# Patient Record
Sex: MALE | Race: WHITE | NOT HISPANIC OR LATINO | Employment: FULL TIME | URBAN - METROPOLITAN AREA
[De-identification: names, ages, dates, MRNs, and addresses within clinical notes are randomized per-mention and may not be internally consistent; named-entity substitution may affect disease eponyms.]

---

## 2017-04-19 ENCOUNTER — TELEPHONE (OUTPATIENT)
Dept: DERMATOLOGY | Facility: CLINIC | Age: 27
End: 2017-04-19

## 2017-04-19 NOTE — TELEPHONE ENCOUNTER
Left message for patient regarding upcoming appointment on 4/27/17 at 3:30.  Informed patient to bring an updated list of allergies, medications, pharmacy details and insurance information. Asked patient to bring any dermatology records from outside [place] or the [facility] or have them faxed to [phone].    Patient Reminders Given:  --Plan on being in our facility for approximately one hour, this includes the registration process, office visit, education and check-out process.  If you are having a procedure, more time may be required.     --If you are having a procedure, please, present 15 minutes early.  --We are located on the second floor of the building, check in desk 4.   --If you need to cancel or reschedule, call [phone].  --We look forward to seeing you in Dermatology Clinic.       Anish Martinez Medical Assistant

## 2017-04-27 ENCOUNTER — OFFICE VISIT (OUTPATIENT)
Dept: DERMATOLOGY | Facility: CLINIC | Age: 27
End: 2017-04-27
Payer: COMMERCIAL

## 2017-04-27 DIAGNOSIS — D22.9 MULTIPLE NEVI: Primary | ICD-10-CM

## 2017-04-27 DIAGNOSIS — Z86.018 HISTORY OF DYSPLASTIC NEVUS: ICD-10-CM

## 2017-04-27 PROCEDURE — 99203 OFFICE O/P NEW LOW 30 MIN: CPT | Performed by: DERMATOLOGY

## 2017-04-27 NOTE — NURSING NOTE
"Dermatology Rooming Note    Arash Huston's goals for this visit include:   Chief Complaint   Patient presents with     Skin Check     No areas of concern hx \"pre melanomas\" removed per patient       Is a scribe okay for this visit:YES    Are records needed for this visit(If yes, obtain release of information): No, patient seen over 10 years ago at [facility] in [place]     Vitals: There were no vitals taken for this visit.    Referring Provider:  No referring provider defined for this encounter.    Diamante Silva LPN          "

## 2017-04-27 NOTE — PROGRESS NOTES
"[facility] Dermatology Note      Dermatology Problem List:  1. H/O Dysplastic nevi: per pt. No bx records.   Last TBSE: 4/27/17    Encounter Date: Apr 27, 2017    CC:  Chief Complaint   Patient presents with     Skin Check     No areas of concern hx \"pre melanomas\" removed per patient         History of Present Illness:  Mr. Arash Huston is a 26 year old male who presents for evaluation of skin check. Today, the pt reports he has a hx of \"pre-melanoma\" and would like his skin checked. The pt is otherwise feeling well with no additional skin concerns.      Past Medical History:   There is no problem list on file for this patient.    No past medical history on file.  No past surgical history on file.    Social History:  The patient works as a X-ray Tech. The patient admits to use of tanning beds once in his life. The pt admits to 3-6 alcoholic beverages a week and is a nonsmoker. The pt denies chewing tobacco.     Family History:  There is a family history of melanoma in the patient's cousin/uncle (Had moles removed per patient).    Medications:  Current Outpatient Prescriptions   Medication Sig Dispense Refill     LITHIUM CARBONATE PO        LAMOTRIGINE PO          Not on File    Review of Systems:  -Skin/Heme New Pt: The patient denies frequent sun exposure. The patient denies excessive scarring or problems healing except as per HPI. The patient denies excessive bleeding. The pt admits to having 3 moles removed.   -Constitutional: The patient is otherwise feeling well.     Physical exam:  Vitals: There were no vitals taken for this visit.  GEN: This is a well-nourished, well developed male in no acute distress  NEURO: Alert and oriented  PSYCH: in a pleasant mood, appropriate affect  SKIN: Total skin excluding the undergarment areas was performed. The exam included the head/face, neck, both arms, chest, back, abdomen, both legs, digits and/or nails.   -Multiple regular brown pigmented macules and " papules are identified on the trunk and extremities.   -Sites of prior nevi removal are well healed without pigment recurrence  -There are likely greater than 100 nevi on his skin  -No other lesions of concern on areas examined.       Impression/Plan:  1. History of dysplastic nevus: unknown grading.    Continue routine skin checks given number of nevi.    High numbers of nevi in themselves confer increased risk of melanoma.  2. Multiple Benign nevi    No further intervention required. Patient to report changes.     Follow-up in 1 year, earlier for new or changing lesions.       Staff Involved:  Scribe/Staff      Scribe Disclosure:   I, Christie Parker, am serving as a scribe to document services personally performed by Dr. Jayjay Mullen, based on data collection and the provider's statements to me.     Provider Disclosure:   I have reviewed the documentation recorded by the scribe and have edited it as needed. I have personally performed the services documented here and the documentation accurately represents those services and the decisions made by me.     Jayjay Mullen MD, MS    Department of Dermatology  [facility]: Phone: [phone], Fax:[phone]  [facility]: Phone: [phone], Fax: [phone]

## 2017-04-27 NOTE — MR AVS SNAPSHOT
After Visit Summary   2017    Arash Huston    MRN: [id number]           Patient Information     Date Of Birth          1990        Visit Information        Provider Department      2017 3:30 PM Jayjay Mullen MD [facility]        Today's Diagnoses     Multiple nevi    -  1    History of dysplastic nevus           Follow-ups after your visit        Who to contact     If you have questions or need follow up information about today's clinic visit or your schedule please contact [facility] directly at [phone].  Normal or non-critical lab and imaging results will be communicated to you by BigCalchart, letter or phone within 4 business days after the clinic has received the results. If you do not hear from us within 7 days, please contact the clinic through BigCalchart or phone. If you have a critical or abnormal lab result, we will notify you by phone as soon as possible.  Submit refill requests through Vaavud or call your pharmacy and they will forward the refill request to us. Please allow 3 business days for your refill to be completed.          Additional Information About Your Visit        MyChart Information     Vaavud is an electronic gateway that provides easy, online access to your medical records. With Vaavud, you can request a clinic appointment, read your test results, renew a prescription or communicate with your care team.     To sign up for Vaavud visit the website at [email or web address]   You will be asked to enter the access code listed below, as well as some personal information. Please follow the directions to create your username and password.     Your access code is: [email or web address]  Expires: 2017  3:52 PM     Your access code will  in 90 days. If you need help or a new code, please contact your [facility] or call [phone] for assistance.        Care EveryWhere ID     This is your  Care EveryWhere ID. This could be used by other organizations to access your [facility] medical records  [id number]         Blood Pressure from Last 3 Encounters:   No data found for BP    Weight from Last 3 Encounters:   No data found for Wt              Today, you had the following     No orders found for display       Primary Care Provider    [facility]       No address on file        Thank you!     Thank you for choosing [facility]  for your care. Our goal is always to provide you with excellent care. Hearing back from our patients is one way we can continue to improve our services. Please take a few minutes to complete the written survey that you may receive in the mail after your visit with us. Thank you!             Your Updated Medication List - Protect others around you: Learn how to safely use, store and throw away your medicines at www.disposemymeds.org.          This list is accurate as of: 4/27/17  3:52 PM.  Always use your most recent med list.                   Brand Name Dispense Instructions for use    LAMOTRIGINE PO          LITHIUM CARBONATE PO

## 2021-08-12 ENCOUNTER — LAB REQUISITION (OUTPATIENT)
Dept: LAB | Facility: CLINIC | Age: 31
End: 2021-08-12

## 2021-08-12 LAB — HBV SURFACE AB SERPL IA-ACNC: 0.76 M[IU]/ML

## 2021-08-12 PROCEDURE — 86787 VARICELLA-ZOSTER ANTIBODY: CPT | Performed by: INTERNAL MEDICINE

## 2021-08-12 PROCEDURE — 86481 TB AG RESPONSE T-CELL SUSP: CPT | Performed by: INTERNAL MEDICINE

## 2021-08-12 PROCEDURE — 86706 HEP B SURFACE ANTIBODY: CPT | Performed by: INTERNAL MEDICINE

## 2021-08-13 LAB
GAMMA INTERFERON BACKGROUND BLD IA-ACNC: 0.03 IU/ML
M TB IFN-G BLD-IMP: NEGATIVE
M TB IFN-G CD4+ BCKGRND COR BLD-ACNC: 9.97 IU/ML
MITOGEN IGNF BCKGRD COR BLD-ACNC: 0.01 IU/ML
MITOGEN IGNF BCKGRD COR BLD-ACNC: 0.02 IU/ML
QUANTIFERON MITOGEN: 10 IU/ML
QUANTIFERON NIL TUBE: 0.03 IU/ML
QUANTIFERON TB1 TUBE: 0.04 IU/ML
QUANTIFERON TB2 TUBE: 0.05
VZV IGG SER QL IA: 959.8 INDEX
VZV IGG SER QL IA: POSITIVE

## 2021-09-30 ENCOUNTER — APPOINTMENT (OUTPATIENT)
Dept: URGENT CARE | Facility: URGENT CARE | Age: 31
End: 2021-09-30

## 2021-10-09 ENCOUNTER — HEALTH MAINTENANCE LETTER (OUTPATIENT)
Age: 31
End: 2021-10-09

## 2021-12-08 ENCOUNTER — TRANSFERRED RECORDS (OUTPATIENT)
Dept: HEALTH INFORMATION MANAGEMENT | Facility: CLINIC | Age: 31
End: 2021-12-08

## 2022-02-21 ENCOUNTER — TRANSFERRED RECORDS (OUTPATIENT)
Dept: HEALTH INFORMATION MANAGEMENT | Facility: CLINIC | Age: 32
End: 2022-02-21

## 2022-04-01 ENCOUNTER — TRANSFERRED RECORDS (OUTPATIENT)
Dept: HEALTH INFORMATION MANAGEMENT | Facility: CLINIC | Age: 32
End: 2022-04-01

## 2022-04-04 ENCOUNTER — TRANSFERRED RECORDS (OUTPATIENT)
Dept: HEALTH INFORMATION MANAGEMENT | Facility: CLINIC | Age: 32
End: 2022-04-04

## 2022-07-05 ENCOUNTER — TRANSFERRED RECORDS (OUTPATIENT)
Dept: HEALTH INFORMATION MANAGEMENT | Facility: CLINIC | Age: 32
End: 2022-07-05

## 2022-07-05 ENCOUNTER — MEDICAL CORRESPONDENCE (OUTPATIENT)
Dept: HEALTH INFORMATION MANAGEMENT | Facility: CLINIC | Age: 32
End: 2022-07-05

## 2022-07-12 ENCOUNTER — TRANSCRIBE ORDERS (OUTPATIENT)
Dept: OTHER | Age: 32
End: 2022-07-12

## 2022-07-12 DIAGNOSIS — R05.3 CHRONIC COUGH: Primary | ICD-10-CM

## 2022-08-10 NOTE — TELEPHONE ENCOUNTER
FUTURE VISIT INFORMATION      FUTURE VISIT INFORMATION:    Date: 11/14/2022    Time: 7:30 AM with SLP    Location: [facility]  REFERRAL INFORMATION:    Referring provider: Mary Ferreira NP    Referring providers clinic:  [facility]    Reason for visit/diagnosis: Chronic Cough    RECORDS REQUESTED FROM:       Clinic name Comments Records Status Imaging Status   [facility] 7/5/22 - GI OV with Mary Ferreira NP 8/10 Req - scanned in EPIC    [facility] - Procedure 4/1/22 - EGD Scanned In    [facility]  Fax: [phone] 12/8/21  Note from Dr Rodrigez 8/10 Req - sent to scan 8/12/22    [facility] 3/24/22, 11/4/21 - PCC OV with Dr. Lucas  4/28/21 - PULM OV with Dr. Hanks  3/23/21 - ALLERGY OV with Dr. Dior Care Everywhere    [facility] - Procedure 5/3/21 - PFT (graphs linked in CE)  3/23/21 - PGT (linked in CE) Care Everywhere    [facility] - Imaging 11/23/20 - XR Chest Care Everywhere 8/10 Req - PACS   [facility]   [facility] MRN: [id number]   2/21/22 CT Paranasal  Sent to scan 8/12/22 req 8/31/22 - PACS           * 8/10/22 11:28 AM Faxed req to [facility] and [facility] for records and images to be sent   to the clinic. - Keri  8/12/22 1:15PM received [facility] recs, sent to scan. PEnding req for images - Amay   8/31/22 4:55PM received [facility] images, sent a fax to [facility] For images - Amay   9/13/22 1:58PM images received in PACS - Amay

## 2022-09-11 ENCOUNTER — HEALTH MAINTENANCE LETTER (OUTPATIENT)
Age: 32
End: 2022-09-11

## 2022-09-26 ENCOUNTER — LAB REQUISITION (OUTPATIENT)
Dept: LAB | Facility: CLINIC | Age: 32
End: 2022-09-26

## 2022-09-26 PROCEDURE — 86481 TB AG RESPONSE T-CELL SUSP: CPT | Performed by: INTERNAL MEDICINE

## 2022-09-28 LAB
GAMMA INTERFERON BACKGROUND BLD IA-ACNC: 0.04 IU/ML
M TB IFN-G BLD-IMP: NEGATIVE
M TB IFN-G CD4+ BCKGRND COR BLD-ACNC: 8.98 IU/ML
MITOGEN IGNF BCKGRD COR BLD-ACNC: -0.01 IU/ML
MITOGEN IGNF BCKGRD COR BLD-ACNC: 0 IU/ML
QUANTIFERON MITOGEN: 9.02 IU/ML
QUANTIFERON NIL TUBE: 0.04 IU/ML
QUANTIFERON TB1 TUBE: 0.03 IU/ML
QUANTIFERON TB2 TUBE: 0.04

## 2022-11-14 ENCOUNTER — OFFICE VISIT (OUTPATIENT)
Dept: OTOLARYNGOLOGY | Facility: CLINIC | Age: 32
End: 2022-11-14
Payer: COMMERCIAL

## 2022-11-14 ENCOUNTER — PRE VISIT (OUTPATIENT)
Dept: OTOLARYNGOLOGY | Facility: CLINIC | Age: 32
End: 2022-11-14

## 2022-11-14 ENCOUNTER — VIRTUAL VISIT (OUTPATIENT)
Dept: OTOLARYNGOLOGY | Facility: CLINIC | Age: 32
End: 2022-11-14
Payer: COMMERCIAL

## 2022-11-14 ENCOUNTER — TELEPHONE (OUTPATIENT)
Dept: OTOLARYNGOLOGY | Facility: CLINIC | Age: 32
End: 2022-11-14

## 2022-11-14 VITALS
BODY MASS INDEX: 24.83 KG/M2 | WEIGHT: 163.8 LBS | OXYGEN SATURATION: 98 % | HEIGHT: 68 IN | DIASTOLIC BLOOD PRESSURE: 79 MMHG | SYSTOLIC BLOOD PRESSURE: 122 MMHG | HEART RATE: 91 BPM

## 2022-11-14 DIAGNOSIS — R05.3 CHRONIC COUGH: ICD-10-CM

## 2022-11-14 DIAGNOSIS — J38.7 IRRITABLE LARYNX SYNDROME: ICD-10-CM

## 2022-11-14 DIAGNOSIS — R49.0 DYSPHONIA: ICD-10-CM

## 2022-11-14 DIAGNOSIS — R49.0 DYSPHONIA: Primary | ICD-10-CM

## 2022-11-14 DIAGNOSIS — R05.3 CHRONIC COUGH: Primary | ICD-10-CM

## 2022-11-14 DIAGNOSIS — K21.9 GASTROESOPHAGEAL REFLUX DISEASE WITHOUT ESOPHAGITIS: ICD-10-CM

## 2022-11-14 PROCEDURE — 92524 BEHAVRAL QUALIT ANALYS VOICE: CPT | Mod: GN | Performed by: SPEECH-LANGUAGE PATHOLOGIST

## 2022-11-14 PROCEDURE — 31575 DIAGNOSTIC LARYNGOSCOPY: CPT | Performed by: OTOLARYNGOLOGY

## 2022-11-14 PROCEDURE — 99204 OFFICE O/P NEW MOD 45 MIN: CPT | Mod: 25 | Performed by: OTOLARYNGOLOGY

## 2022-11-14 RX ORDER — MIRTAZAPINE 7.5 MG/1
TABLET, FILM COATED ORAL
COMMUNITY
Start: 2022-11-06

## 2022-11-14 RX ORDER — FAMOTIDINE 40 MG/1
TABLET, FILM COATED ORAL EVERY 24 HOURS
COMMUNITY
Start: 2022-09-15

## 2022-11-14 ASSESSMENT — PAIN SCALES - GENERAL: PAINLEVEL: NO PAIN (0)

## 2022-11-14 NOTE — LETTER
11/14/2022      RE: Arash Huston  [address]  [place] [address]       Dear Mary Ferreira NP:    I had the pleasure of meeting Arash Huston in consultation at the [facility] the [facility] at your request, for evaluation of chronic cough and chronic throat-clearing. The note from our visit follows. Speech recognition software may have been used in the documentation below; input is reviewed before signature to the best of my ability. I appreciate the opportunity to participate in the care of this pleasant patient.    Please feel free to contact me with any questions.    Sincerely yours,      Lorna Reid M.D., M.P.H.  , Laryngology  Director, [facility]  Otolaryngology- Head & Neck Surgery  [phone]          =====    HISTORY OF PRESENT ILLNESS:   Arash Huston is a pleasant 32-year-old male with a past medical history including scoliosis status post remote T1-9 fusion and bipolar disorder diagnosed in 2009 who presents with a two year history of chronic cough and chronic throat-clearing.       Voice  No concerns. However, after the exam was completed, he did report that he used to sing a lot and has not been as much lately.  He notices vocal fatigue when he sings now. He is not sure if it is related to deconditioning. 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      Swallowing  He has a sense of irritation in the throat with eating, but no trouble with things going down the wrong tube or getting stuck. No recent pneumonia.      Cough/Throat-clearing  He has a two year history of mostly non-productive chronic cough, worse in the mornings and with meals. Omeprazole did help somewhat but cough persisted. He was seen by pulmonology, and tried an inhaler but the cough persisted.  Allergy workup was negative. He saw Dr. Rodrigez and tried increased omeprazole and Mucinex. These helped a little. Currently he is on famotidine and a wedge pillow, which has been helpful. He is no longer having coughing attacks, just occasional little coughs. He also has some throat-clearing that improves throughout the day. No definite triggers.     Chest x-ray on 11/23/20 was negative per records.  Pulmonary function testing on 5/3/21: moderate restrictive impairment but normal diffusion capacity. History of scoliosis.  CT scan of the sinus on 2/21/22 was negative per records.  EGD with negative biopsies on 4/1/22. He underwent empiric dilation.  Bravo pH was considered but has not been completed.      Breathing  No concerns.       Throat discomfort  No concerns.       Reflux-type symptoms  He experiences heartburn/indigestion rarely. He is taking reflux medications.      Prior Epic/outside records were reviewed for this visit, including:  Mary Ferreira NP 7/5/22  Vini Rodrigez MD 12/8/21  Bartolo Poe MD 4/4/22 - upper endoscopy biopsies normal  Ger Hanks MBBS  4/28/2021, 5/4/21      MEDICATIONS:     Current Outpatient Medications   Medication Sig Dispense Refill     famotidine (PEPCID) 40 MG tablet Take by mouth every 24 hours       LAMOTRIGINE PO        mirtazapine (REMERON) 7.5 MG tablet TAKE 1 TABLET BY MOUTH EVERY DAY AT NIGHT       LITHIUM CARBONATE PO          ALLERGIES:  No Known Allergies    From Dr Rodrigez note 12/8/21:      PAST MEDICAL HISTORY: As above  PAST SURGICAL HISTORY: As above  HABITS/SOCIAL HISTORY:  As above  FAMILY HISTORY:  No family history on file.  Noncontributory.    REVIEW OF SYSTEMS:  Comprehensive 11 point review of systems was reviewed. Positives are as noted below; pertinent findings are as noted in the HPI.     Patient Supplied Answers to Review of Systems   Noncontributory       PHYSICAL EXAMINATION:  General: The patient was alert and conversant, and in no  acute distress.    Eyes: PERRL, conjunctiva and lids normal, sclera nonicteric.  Nose: Anterior rhinoscopy: no gross abnormalities. no  bleeding; no  mucopurulence; septum grossly normal, mild mucoid drainage and/or crusting.  Oral cavity/oropharynx: No masses or lesions. Dentition in good condition. Floor of mouth and oral tongue soft to palpation. Tongue mobility and palate elevation intact and symmetric.  Ears: Normal auricles, external auditory canals bilaterally. Visualized portions of tympanic membranes normal bilaterally.   Neck: No palpable cervical lymphadenopathy. There was moderate  tenderness to palpation of the thyrohyoid space, which was narrow. No obvious thyroid abnormality. Landmarks palpable.  Resp: Breathing comfortably, no stridor or stertor.  Neuro: Symmetric facial function. Other cranial nerves as documented above.  Psych: Normal affect, pleasant and cooperative.  Voice/speech: No significant dysphonia of speaking voice.  Extremities: No cyanosis, clubbing, or edema of the upper extremities.    Intake scores  Total Score for Last Patient-Answered VHI Questionnaire  No flowsheet data found.  Total Score for Last Patient-Answered EAT Questionnaire  No flowsheet data found.  Total Score for Last Patient-Answered CSI Questionnaire  No flowsheet data found.      PROCEDURE:   Flexible fiberoptic laryngoscopy  Indications: This procedure was warranted to evaluate the patient's laryngeal anatomy and function. Risks, benefits, and alternatives were discussed.  Description: After written informed consent was obtained, a time-out was performed to confirm patient identity, procedure, and procedure site. Topical 3% lidocaine with 0.25% phenylephrine was applied to the nasal cavities. I performed the endoscopy and no complications were apparent.  Performed by: Lorna Reid MD MPH  SLP: Gisselle Bang MS CCC-SLP   Findings: Normal nasopharynx. Normal base of tongue, valleculae, and epiglottis. The  right true vocal fold demonstrated normal mobility. The left true vocal fold demonstrated normal mobility. The medial edges of the vocal folds appeared smooth and straight. No focal mucosal lesions were observed on the true vocal folds. Glissade produced appropriate elongation. There was moderate to severe supraglottic recruitment with connected speech. Mucosa of the false vocal folds, aryepiglottic folds, piriform sinuses, and posterior glottis unremarkable. Airway was patent. Response to the therapy probes was good. No focal lesions on NBI.                      IMAGING/RESULTS reviewed, with pertinent report excerpts below:  As above      IMPRESSION AND PLAN:   Arash Huston presents with chronic cough/throat-clearing that has been improving over the past month, as well as some vocal fatigue with singing. Current symptoms are mostly throat-clearing and mild coughing. He is on reflux medications/precautions which have also been helping.    Recommendations:  1) I recommended speech therapy as initial primary management, with goals including improving laryngeal hygiene, reducing laryngeal irritability, improving laryngeal efficiency and improving respiratory/phonatory coordination.  If any voice concerns persist beyond this, we will need to further assess with stroboscopy. Stroboscopy was not performed today as singing voice concerns had not been mentioned until after the exam was already completed.     2) After cough/throat-clearing have been optimized from a laryngeal perspective, consider seeking guidance from [facility] regarding duration/nature of acid reflux medications. Kenny had been considered but not completed.    I asked the patient to return to see me again if symptoms persist despite the steps above. I appreciate the opportunity to participate in the care of this patient.     Today's visit required additional screening time, PPE, and cleaning measures to allow for a safe in-person visit,  due to the public health emergency.    I spent a total of 48 minutes on 11/14/2022 in chart review, review of tests, patient visit, documentation, care coordination, and/or discussion with other providers about the issues documented above, separate from any documented procedure(s).    Lorna Reid MD

## 2022-11-14 NOTE — PATIENT INSTRUCTIONS
1.  You were seen in the ENT Clinic today by . If you have any questions or concerns after your appointment, please call [phone]. Press option #1 for scheduling related needs. Press option #3 for Nurse advice.    2.   has recommended the following:   - speech therapy   - after therapy consider follow up with GI for reflux meds    3.  Plan is to return to clinic as needed.      Tori Kyle LPN  [phone]  [facility] - Otolaryngology

## 2022-11-14 NOTE — TELEPHONE ENCOUNTER
Patient needs to be scheduled for a Return Voice SLP appointment with Gisselle Bang MS CCC-SLP     Number and Frequency of sessions:   3 therapy sessions about 2 weeks apart. Virtual preferred.     NOTE, UNLESS SPECIFICALLY STATED IN SPECIAL INSTRUCTIONS ABOVE   Virtual appointments may be scheduled during in person times (but not the reverse)   When availability is limited the first appointment may be scheduled even if subsequent appointments aren't available in the prescribed timeframe (e.g. 14 days between appointments)   There should be at least 7 days between appointments   Once a patient has begun therapy they should only see that specific SLP (this does not include initial or follow-up evaluation)   Patient may schedule as many or as few of the sessions listed above as they desire

## 2022-11-14 NOTE — PROGRESS NOTES
Dear Mary Ferreira NP:    I had the pleasure of meeting Arash Huston in consultation at the [facility] of the [facility] at your request, for evaluation of chronic cough and chronic throat-clearing. The note from our visit follows. Speech recognition software may have been used in the documentation below; input is reviewed before signature to the best of my ability. I appreciate the opportunity to participate in the care of this pleasant patient.    Please feel free to contact me with any questions.    Sincerely yours,      Lorna Reid M.D., M.P.H.  , Laryngology  Director, [facility]  Otolaryngology- Head & Neck Surgery  [phone]          =====    HISTORY OF PRESENT ILLNESS:   Arash Huston is a pleasant 32-year-old male with a past medical history including scoliosis status post remote T1-9 fusion and bipolar disorder diagnosed in 2009 who presents with a two year history of chronic cough and chronic throat-clearing.       Voice  No concerns. However, after the exam was completed, he did report that he used to sing a lot and has not been as much lately.  He notices vocal fatigue when he sings now. He is not sure if it is related to deconditioning. 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      Swallowing  He has a sense of irritation in the throat with eating, but no trouble with things going down the wrong tube or getting stuck. No recent pneumonia.      Cough/Throat-clearing  He has a two year history of mostly non-productive chronic cough, worse in the mornings and with meals. Omeprazole did help somewhat but cough persisted. He was seen by pulmonology, and tried an inhaler but the cough persisted. Allergy workup was negative. He saw Dr. Rodrigez and tried increased omeprazole and  Mucinex. These helped a little. Currently he is on famotidine and a wedge pillow, which has been helpful. He is no longer having coughing attacks, just occasional little coughs. He also has some throat-clearing that improves throughout the day. No definite triggers.     Chest x-ray on 11/23/20 was negative per records.  Pulmonary function testing on 5/3/21: moderate restrictive impairment but normal diffusion capacity. History of scoliosis.  CT scan of the sinus on 2/21/22 was negative per records.  EGD with negative biopsies on 4/1/22. He underwent empiric dilation.  Bravo pH was considered but has not been completed.      Breathing  No concerns.       Throat discomfort  No concerns.       Reflux-type symptoms  He experiences heartburn/indigestion rarely. He is taking reflux medications.      Prior Epic/outside records were reviewed for this visit, including:  Mary Ferreira NP 7/5/22  Vini Rodrigez MD 12/8/21  Bartolo Poe MD 4/4/22 - upper endoscopy biopsies normal  Ger Hanks MBBS  4/28/2021, 5/4/21      MEDICATIONS:     Current Outpatient Medications   Medication Sig Dispense Refill     famotidine (PEPCID) 40 MG tablet Take by mouth every 24 hours       LAMOTRIGINE PO        mirtazapine (REMERON) 7.5 MG tablet TAKE 1 TABLET BY MOUTH EVERY DAY AT NIGHT       LITHIUM CARBONATE PO          ALLERGIES:  No Known Allergies    From Dr Rodrigez note 12/8/21:      PAST MEDICAL HISTORY: As above  PAST SURGICAL HISTORY: As above  HABITS/SOCIAL HISTORY:  As above  FAMILY HISTORY:  No family history on file.  Noncontributory.    REVIEW OF SYSTEMS:  Comprehensive 11 point review of systems was reviewed. Positives are as noted below; pertinent findings are as noted in the HPI.     Patient Supplied Answers to Review of Systems   Noncontributory       PHYSICAL EXAMINATION:  General: The patient was alert and conversant, and in no acute distress.    Eyes: PERRL, conjunctiva and lids normal, sclera  nonicteric.  Nose: Anterior rhinoscopy: no gross abnormalities. no  bleeding; no  mucopurulence; septum grossly normal, mild mucoid drainage and/or crusting.  Oral cavity/oropharynx: No masses or lesions. Dentition in good condition. Floor of mouth and oral tongue soft to palpation. Tongue mobility and palate elevation intact and symmetric.  Ears: Normal auricles, external auditory canals bilaterally. Visualized portions of tympanic membranes normal bilaterally.   Neck: No palpable cervical lymphadenopathy. There was moderate  tenderness to palpation of the thyrohyoid space, which was narrow. No obvious thyroid abnormality. Landmarks palpable.  Resp: Breathing comfortably, no stridor or stertor.  Neuro: Symmetric facial function. Other cranial nerves as documented above.  Psych: Normal affect, pleasant and cooperative.  Voice/speech: No significant dysphonia of speaking voice.  Extremities: No cyanosis, clubbing, or edema of the upper extremities.    Intake scores  Total Score for Last Patient-Answered VHI Questionnaire  No flowsheet data found.  Total Score for Last Patient-Answered EAT Questionnaire  No flowsheet data found.  Total Score for Last Patient-Answered CSI Questionnaire  No flowsheet data found.      PROCEDURE:   Flexible fiberoptic laryngoscopy  Indications: This procedure was warranted to evaluate the patient's laryngeal anatomy and function. Risks, benefits, and alternatives were discussed.  Description: After written informed consent was obtained, a time-out was performed to confirm patient identity, procedure, and procedure site. Topical 3% lidocaine with 0.25% phenylephrine was applied to the nasal cavities. I performed the endoscopy and no complications were apparent.  Performed by: Lorna Reid MD MPH  SLP: Gisselle Bang MS CCC-SLP   Findings: Normal nasopharynx. Normal base of tongue, valleculae, and epiglottis. The right true vocal fold demonstrated normal mobility. The left true vocal  fold demonstrated normal mobility. The medial edges of the vocal folds appeared smooth and straight. No focal mucosal lesions were observed on the true vocal folds. Glissade produced appropriate elongation. There was moderate to severe supraglottic recruitment with connected speech. Mucosa of the false vocal folds, aryepiglottic folds, piriform sinuses, and posterior glottis unremarkable. Airway was patent. Response to the therapy probes was good. No focal lesions on NBI.                      IMAGING/RESULTS reviewed, with pertinent report excerpts below:  As above      IMPRESSION AND PLAN:   Arash Huston presents with chronic cough/throat-clearing that has been improving over the past month, as well as some vocal fatigue with singing. Current symptoms are mostly throat-clearing and mild coughing. He is on reflux medications/precautions which have also been helping.    Recommendations:  1) I recommended speech therapy as initial primary management, with goals including improving laryngeal hygiene, reducing laryngeal irritability, improving laryngeal efficiency and improving respiratory/phonatory coordination.  If any voice concerns persist beyond this, we will need to further assess with stroboscopy. Stroboscopy was not performed today as singing voice concerns had not been mentioned until after the exam was already completed.     2) After cough/throat-clearing have been optimized from a laryngeal perspective, consider seeking guidance from [facility] regarding duration/nature of acid reflux medications. Kenny had been considered but not completed.    I asked the patient to return to see me again if symptoms persist despite the steps above. I appreciate the opportunity to participate in the care of this patient.     Today's visit required additional screening time, PPE, and cleaning measures to allow for a safe in-person visit, due to the public health emergency.    I spent a total of 48 minutes on  11/14/2022 in chart review, review of tests, patient visit, documentation, care coordination, and/or discussion with other providers about the issues documented above, separate from any documented procedure(s).

## 2022-11-14 NOTE — Clinical Note
11/14/2022       RE: Arash Huston  [address]  [place] [address]     Dear Colleague,    Thank you for referring your patient, Arash Huston, to the [facility] EAR NO[facility] AND [facility]ROAT [facility]INIC [place] at [facility]. Please see a copy of my visit note below.    Dear Mary Ferreira NP:    I had the pleasure of meeting Arash Huston in consultation at the [facility] of the [facility] at your request, for evaluation of chronic cough and chronic throat-clearing. The note from our visit follows. Speech recognition software may have been used in the documentation below; input is reviewed before signature to the best of my ability. I appreciate the opportunity to participate in the care of this pleasant patient.    Please feel free to contact me with any questions.    Sincerely yours,      Lorna Reid M.D., M.P.H.  , Laryngology  Director, [facility]  Otolaryngology- Head & Neck Surgery  [phone]          =====    HISTORY OF PRESENT ILLNESS:   Arash Huston is a pleasant 32-year-old male with a past medical history including scoliosis status post remote T1-9 fusion and bipolar disorder diagnosed in 2009 who presents with a two year history of chronic cough and chronic throat-clearing.       Voice  No concerns. However, after the exam was completed, he did report that he used to sing a lot and has not been as much lately.  He notices vocal fatigue when he sings now. He is not sure if it is related to deconditioning. 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      Swallowing  He has a sense of irritation in the throat with eating, but no trouble with things going down the wrong tube or getting stuck. No recent  pneumonia.      Cough/Throat-clearing  He has a two year history of mostly non-productive chronic cough, worse in the mornings and with meals. Omeprazole did help somewhat but cough persisted. He was seen by pulmonology, and tried an inhaler but the cough persisted. Allergy workup was negative. He saw Dr. Rodrigez and tried increased omeprazole and Mucinex. These helped a little. Currently he is on famotidine and a wedge pillow, which has been helpful. He is no longer having coughing attacks, just occasional little coughs. He also has some throat-clearing that improves throughout the day. No definite triggers.     Chest x-ray on 11/23/20 was negative per records.  Pulmonary function testing on 5/3/21: moderate restrictive impairment but normal diffusion capacity. History of scoliosis.  CT scan of the sinus on 2/21/22 was negative per records.  EGD with negative biopsies on 4/1/22. He underwent empiric dilation.  Bravo pH was considered but has not been completed.      Breathing  No concerns.       Throat discomfort  No concerns.       Reflux-type symptoms  He experiences heartburn/indigestion rarely. He is taking reflux medications.      Prior Epic/outside records were reviewed for this visit, including:  Mary Ferreira NP 7/5/22  Vini Rodrigez MD 12/8/21  Bartolo Poe MD 4/4/22 - upper endoscopy biopsies normal  Ger Hanks MBBS  4/28/2021, 5/4/21      MEDICATIONS:     Current Outpatient Medications   Medication Sig Dispense Refill     famotidine (PEPCID) 40 MG tablet Take by mouth every 24 hours       LAMOTRIGINE PO        mirtazapine (REMERON) 7.5 MG tablet TAKE 1 TABLET BY MOUTH EVERY DAY AT NIGHT       LITHIUM CARBONATE PO          ALLERGIES:  No Known Allergies    From Dr Rodrigez note 12/8/21:      PAST MEDICAL HISTORY: As above  PAST SURGICAL HISTORY: As above  HABITS/SOCIAL HISTORY:  As above  FAMILY HISTORY:  No family history on file.  Noncontributory.    REVIEW OF SYSTEMS:  Comprehensive 11  point review of systems was reviewed. Positives are as noted below; pertinent findings are as noted in the HPI.     Patient Supplied Answers to Review of Systems   Noncontributory       PHYSICAL EXAMINATION:  General: The patient was alert and conversant, and in no acute distress.    Eyes: PERRL, conjunctiva and lids normal, sclera nonicteric.  Nose: Anterior rhinoscopy: no gross abnormalities. no  bleeding; no  mucopurulence; septum grossly normal, mild mucoid drainage and/or crusting.  Oral cavity/oropharynx: No masses or lesions. Dentition in good condition. Floor of mouth and oral tongue soft to palpation. Tongue mobility and palate elevation intact and symmetric.  Ears: Normal auricles, external auditory canals bilaterally. Visualized portions of tympanic membranes normal bilaterally.   Neck: No palpable cervical lymphadenopathy. There was moderate  tenderness to palpation of the thyrohyoid space, which was narrow. No obvious thyroid abnormality. Landmarks palpable.  Resp: Breathing comfortably, no stridor or stertor.  Neuro: Symmetric facial function. Other cranial nerves as documented above.  Psych: Normal affect, pleasant and cooperative.  Voice/speech: No significant dysphonia of speaking voice.  Extremities: No cyanosis, clubbing, or edema of the upper extremities.    Intake scores  Total Score for Last Patient-Answered VHI Questionnaire  No flowsheet data found.  Total Score for Last Patient-Answered EAT Questionnaire  No flowsheet data found.  Total Score for Last Patient-Answered CSI Questionnaire  No flowsheet data found.      PROCEDURE:   Flexible fiberoptic laryngoscopy  Indications: This procedure was warranted to evaluate the patient's laryngeal anatomy and function. Risks, benefits, and alternatives were discussed.  Description: After written informed consent was obtained, a time-out was performed to confirm patient identity, procedure, and procedure site. Topical 3% lidocaine with 0.25%  phenylephrine was applied to the nasal cavities. I performed the endoscopy and no complications were apparent.  Performed by: Lorna Reid MD MPH  SLP: Gisselle Bang MS CCC-SLP   Findings: Normal nasopharynx. Normal base of tongue, valleculae, and epiglottis. The right true vocal fold demonstrated normal mobility. The left true vocal fold demonstrated normal mobility. The medial edges of the vocal folds appeared smooth and straight. No focal mucosal lesions were observed on the true vocal folds. Glissade produced appropriate elongation. There was moderate to severe supraglottic recruitment with connected speech. Mucosa of the false vocal folds, aryepiglottic folds, piriform sinuses, and posterior glottis unremarkable. Airway was patent. Response to the therapy probes was good. No focal lesions on NBI.                      IMAGING/RESULTS reviewed, with pertinent report excerpts below:  As above      IMPRESSION AND PLAN:   Arash Huston presents with chronic cough/throat-clearing that has been improving over the past month, as well as some vocal fatigue with singing. Current symptoms are mostly throat-clearing and mild coughing. He is on reflux medications/precautions which have also been helping.    Recommendations:  1) I recommended speech therapy as initial primary management, with goals including improving laryngeal hygiene, reducing laryngeal irritability, improving laryngeal efficiency and improving respiratory/phonatory coordination.  If any voice concerns persist beyond this, we will need to further assess with stroboscopy. This was not completed today as singing voice concerns had not arisen until after the exam was completed.     2) After cough/throat-clearing have been optimized from a laryngeal perspective, consider seeking guidance from [facility] regarding duration/nature of acid reflux medications. Kenny had been considered but not completed.    I asked the patient to return to  see me again if symptoms persist despite the steps above. I appreciate the opportunity to participate in the care of this patient.     Today's visit required additional screening time, PPE, and cleaning measures to allow for a safe in-person visit, due to the public health emergency.    I spent a total of 48 minutes on 11/14/2022 in chart review, review of tests, patient visit, documentation, care coordination, and/or discussion with other providers about the issues documented above, separate from any documented procedure(s).      Dear Mary Ferreira NP:    I had the pleasure of meeting Arash Huston in consultation at the [facility] of the [facility] at your request, for evaluation of chronic cough and chronic throat-clearing. The note from our visit follows. Speech recognition software may have been used in the documentation below; input is reviewed before signature to the best of my ability. I appreciate the opportunity to participate in the care of this pleasant patient.    Please feel free to contact me with any questions.    Sincerely yours,      Lorna Reid M.D., M.P.H.  , Laryngology  Director, [facility]  Otolaryngology- Head & Neck Surgery  [phone]          =====    HISTORY OF PRESENT ILLNESS:   Arash Huston is a pleasant 32-year-old male with a past medical history including scoliosis status post remote T1-9 fusion and bipolar disorder diagnosed in 2009 who presents with a two year history of chronic cough and chronic throat-clearing.       Voice  No concerns. However, after the exam was completed, he did report that he used to sing a lot and has not been as much lately.  He notices vocal fatigue when he sings now. He is not sure if it is related to deconditioning. 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      Swallowing  He has a sense of irritation in the throat with eating, but no trouble with things going down the wrong tube or getting stuck. No recent pneumonia.      Cough/Throat-clearing  He has a two year history of mostly non-productive chronic cough, worse in the mornings and with meals. Omeprazole did help somewhat but cough persisted. He was seen by pulmonology, and tried an inhaler but the cough persisted. Allergy workup was negative. He saw Dr. Rodrigez and tried increased omeprazole and Mucinex. These helped a little. Currently he is on famotidine and a wedge pillow, which has been helpful. He is no longer having coughing attacks, just occasional little coughs. He also has some throat-clearing that improves throughout the day. No definite triggers.     Chest x-ray on 11/23/20 was negative per records.  Pulmonary function testing on 5/3/21: moderate restrictive impairment but normal diffusion capacity. History of scoliosis.  CT scan of the sinus on 2/21/22 was negative per records.  EGD with negative biopsies on 4/1/22. He underwent empiric dilation.  Bravo pH was considered but has not been completed.      Breathing  No concerns.       Throat discomfort  No concerns.       Reflux-type symptoms  He experiences heartburn/indigestion rarely. He is taking reflux medications.      Prior Epic/outside records were reviewed for this visit, including:  Mary Ferreira NP 7/5/22  Vini Rodrigez MD 12/8/21  Bartolo Poe MD 4/4/22 - upper endoscopy biopsies normal  Ger Hanks MBBS  4/28/2021, 5/4/21      MEDICATIONS:     Current Outpatient Medications   Medication Sig Dispense Refill     famotidine (PEPCID) 40 MG tablet Take by mouth every 24 hours       LAMOTRIGINE PO        mirtazapine (REMERON) 7.5 MG tablet TAKE 1 TABLET BY MOUTH EVERY DAY AT NIGHT       LITHIUM CARBONATE PO          ALLERGIES:  No Known Allergies    From Dr Rodrigez note 12/8/21:      PAST MEDICAL  HISTORY: As above  PAST SURGICAL HISTORY: As above  HABITS/SOCIAL HISTORY:  As above  FAMILY HISTORY:  No family history on file.  Noncontributory.    REVIEW OF SYSTEMS:  Comprehensive 11 point review of systems was reviewed. Positives are as noted below; pertinent findings are as noted in the HPI.     Patient Supplied Answers to Review of Systems   Noncontributory       PHYSICAL EXAMINATION:  General: The patient was alert and conversant, and in no acute distress.    Eyes: PERRL, conjunctiva and lids normal, sclera nonicteric.  Nose: Anterior rhinoscopy: no gross abnormalities. no  bleeding; no  mucopurulence; septum grossly normal, mild mucoid drainage and/or crusting.  Oral cavity/oropharynx: No masses or lesions. Dentition in good condition. Floor of mouth and oral tongue soft to palpation. Tongue mobility and palate elevation intact and symmetric.  Ears: Normal auricles, external auditory canals bilaterally. Visualized portions of tympanic membranes normal bilaterally.   Neck: No palpable cervical lymphadenopathy. There was moderate  tenderness to palpation of the thyrohyoid space, which was narrow. No obvious thyroid abnormality. Landmarks palpable.  Resp: Breathing comfortably, no stridor or stertor.  Neuro: Symmetric facial function. Other cranial nerves as documented above.  Psych: Normal affect, pleasant and cooperative.  Voice/speech: No significant dysphonia of speaking voice.  Extremities: No cyanosis, clubbing, or edema of the upper extremities.    Intake scores  Total Score for Last Patient-Answered VHI Questionnaire  No flowsheet data found.  Total Score for Last Patient-Answered EAT Questionnaire  No flowsheet data found.  Total Score for Last Patient-Answered CSI Questionnaire  No flowsheet data found.      PROCEDURE:   Flexible fiberoptic laryngoscopy  Indications: This procedure was warranted to evaluate the patient's laryngeal anatomy and function. Risks, benefits, and alternatives were  discussed.  Description: After written informed consent was obtained, a time-out was performed to confirm patient identity, procedure, and procedure site. Topical 3% lidocaine with 0.25% phenylephrine was applied to the nasal cavities. I performed the endoscopy and no complications were apparent.  Performed by: Lorna Reid MD MPH  SLP: Gisselle Bang MS CCC-SLP   Findings: Normal nasopharynx. Normal base of tongue, valleculae, and epiglottis. The right true vocal fold demonstrated normal mobility. The left true vocal fold demonstrated normal mobility. The medial edges of the vocal folds appeared smooth and straight. No focal mucosal lesions were observed on the true vocal folds. Glissade produced appropriate elongation. There was moderate to severe supraglottic recruitment with connected speech. Mucosa of the false vocal folds, aryepiglottic folds, piriform sinuses, and posterior glottis unremarkable. Airway was patent. Response to the therapy probes was good. No focal lesions on NBI.                      IMAGING/RESULTS reviewed, with pertinent report excerpts below:  As above      IMPRESSION AND PLAN:   Arash Huston presents with chronic cough/throat-clearing that has been improving over the past month, as well as some vocal fatigue with singing. Current symptoms are mostly throat-clearing and mild coughing. He is on reflux medications/precautions which have also been helping.    Recommendations:  1) I recommended speech therapy as initial primary management, with goals including improving laryngeal hygiene, reducing laryngeal irritability, improving laryngeal efficiency and improving respiratory/phonatory coordination.  If any voice concerns persist beyond this, we will need to further assess with stroboscopy. Stroboscopy was not performed today as singing voice concerns had not been mentioned until after the exam was already completed.     2) After cough/throat-clearing have been optimized from a  laryngeal perspective, consider seeking guidance from [facility] regarding duration/nature of acid reflux medications. Kenny had been considered but not completed.    I asked the patient to return to see me again if symptoms persist despite the steps above. I appreciate the opportunity to participate in the care of this patient.     Today's visit required additional screening time, PPE, and cleaning measures to allow for a safe in-person visit, due to the public health emergency.    I spent a total of 48 minutes on 11/14/2022 in chart review, review of tests, patient visit, documentation, care coordination, and/or discussion with other providers about the issues documented above, separate from any documented procedure(s).        Again, thank you for allowing me to participate in the care of your patient.      Sincerely,    Lorna Reid MD

## 2022-11-14 NOTE — LETTER
11/14/2022       RE: Arash Huston  [address]  [facility] [address]     Dear Colleague,    Thank you for referring your patient, Arash Huston, to the [facility]ICE [facility] [place] at [facility]. Please see a copy of my visit note below.    [facility]  Clinical Voice and Upper Airway Evaluation Report    Patient's Name: Arash Huston  Date of Evaluation: 11/14/2022  Providing SLP: Gisselle Bang MS CCC-SLP  Seen in Conjunction With: Lorna Reid MD MPH  Referring Provider and Facility: [facility]  Insurance Coverage: [employer] Other PPO  Chief Complaint: Cough  Assessment / Treatment Time: 7:47 AM - 8:38 AM  Evaluation Location: [facility]  Others in Attendance for the Evaluation: None      Patient History: Arash is a 32-year-old male who presents today for evaluation of chronic cough.     Dysphonia:    Denies voice changes    Endorses some vocal fatigue with singing    Dyspnea:     No pulmonary conditions    Reduced pulmonary function due to scoliosis - no treatments  o T1-9 fusion 20 years ago    Dysphagia: denies possible aspiration, things getting stuck, unintentional weight loss, recent pneumonia    Cough / Throat Clearing:     Onset: a couple years ago    Both coughing and throat clearing    Triggers:    First thing in the morning secondary to throat congestion/increased mucus    When eating and drinking or immediately after    Feels more throat irritation rather than something going down the wrong pipe    Unsure of triggering items: maybe tomato products/acidic foods    Possibly eating too quickly    Possibly stress    Denies scents, temperature, exercise, or talking as triggers    Progression:     Gradually improving over the past couple of months     Improves as the day continues    Better since switching from omeprazole and Mucinex to famotidine and wedge  pillow    Swallowing helps a little    Productivity: was fairly productive but is less so with overall coughing improvements    Frequency/severity: was bigger coughing attacks but hasn't had these in a couple of months - now more 1-3 coughs here and there    Medical / Surgical History:    GERD    Scoliosis    Other Medical Evaluations, Testing, and Treatments:     Famotidine in PM    Pulmonology evaluation: no benefits from inhalers    Allergy evaluation: no significant allergies    GI    Denies straight-forward indigestion or heartburn - mostly feels phlegm    Normal EGD with [facility] on 7/14/22    Declined Bravo testing, as this would not likely change treatment plan    Switched to famotidine only - was previously taking omeprazole and Mucinex    Wedge pillow    ENT: visit with Vini [facility]rigez [facility] on 12/8/21    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        Quality of Life Questionnaires:    Patient Supplied Answers To Last 2 CSI Questionnaires  Cough Severity Index (CSI) 11/14/2022   My cough is worse when I lie down 1   My coughing problem causes me to restrict my personal and social life 1   I tend to avoid places because of my cough problem 1   I feel embarrassed because of my coughing problem 3   People ask, ''What's wrong?'' because I cough a lot 2   I run out of air when I cough 0   My coughing problem affects my voice 1   My coughing problem limits my physical activity 1   My coughing problem upsets me 2   People ask me if I am sick because I cough a lot 3   CSI Score 15     Patient Supplied Answers To Last 2 EAT Questionnaires  Eating Assessment Tool (EAT-10) 11/14/2022   My swallowing problem interferes with my ability to go out for meals 1   Swallowing liquids takes extra effort 0   Swallowing solids takes extra effort 0   Swallowing pills takes extra effort 0   When I swallow food sticks in my throat 2   I cough when I eat 2   Swallowing is  "stressful 1   EAT-10 6       Perceptual Analysis (43018): Evaluation of Voice / Speech / Non-Communicative Laryngeal Behaviors    The GRBAS is a perceptual rating of voice change. 0 indicated no impairment, 3 indicates a severe impairment. This is a rating based on clinical judgement of disordered voice quality.  G ( 1 ) General Dysphonia     R ( 0 ) Roughness     B ( 0 ) Breathiness     A ( 1 ) Asthenia     S ( 1 ) Strain    *Validity of this measure may be slightly reduced when performed via acoustic signal from virtual visit*    Laryngeal palpation:     Thyrohyoid space: compressed and tender    Additional observations:     Coughing / throat clearing: noted intermittently, particularly after topical anesthetic      Laryngoscopy without stroboscopy:    Provider performing exam: Lorna Reid MD MPH    Informed consent: Informed consent was obtained, which includes potential side-effects, risks, and benefits of the procedure.     Anesthetic: Topical anesthesia with 3% lidocaine and 0.25% phenylephrine was applied the nostrils bilaterally.    Scope type: A distal chip flexible laryngoscope was passed through the nare with halogen light source(s).    The laryngeal and pharyngeal structures were evaluated for gross appearance, mobility, function, and focal lesions / abnormalities of the associated mucosa.  All findings were within normal limits with the exception of the following salient features:     Mediolateral Compression: noted with phonation and improved during therapeutic probing    Anteroposterior Compression: \" \"       Impressions and Plan:     Arash is a 32-year-old male presenting today with chronic cough (R05.3) and dysphonia (R49.0) secondary to irritable larynx syndrome (J38.7). Perceptually, his voice is mildly asthenic and strained. Laryngoscopy today demonstrated mediolateral and anteroposterior compression with phonation which reduced during stimulability probes (e.g. increased airflow). " Therefore, voice therapy to reduce chronic coughing/throat clearing and vocal fatigue has been recommended. Arash is in agreement with this plan of care.        Goals:    Chronic Cough    Decrease cough in all activities of daily living using compensation strategies    Independently implement a cough suppression strategy when cough trigger is sensed 90% of the time.?     Decrease the number of coughs per day by 50%     Demonstrate increased tolerance of a chemical and/or environmental irritant as evidenced by increased exposure (decreased proximity and/or increased strength) to that irritant by 50%     Demonstrate a 50% reduction in Cough Severity Index score     Voice    Coordinate phonatory and respiratory subsystems, demonstrating adequate independence for activities of daily communication     Decrease extrinsic laryngeal muscle activity during communication events     Improve voice quality in all activities of daily living using, as measured by a minimum of a 50% point reduction on the Voice Handicap Index-10 or Singing VHI    Demonstrate appropriate vocal hygiene including, but not limited to, adequate hydration, avoiding vocal abuse, integrating behavioral and dietary changes for GERD into their daily life?.     Incorporate behaviors to reduce irritation and promote laryngeal healing and prevent recurrence.?     Implement behavioral strategies to reduce laryngopharyngeal reflux. ?     Independently maintain a forward focus voice placement 90% of the time during conversational speech.    Independently perform the Vocal Function Exercises, rebalancing respiration, phonation, and resonance 90% of the time      Billed Procedures:    No charge facility fee    Perceptual voice assessment [id number]      Thank you for allowing me to participate in this patient's care,    Gisselle Bang MS CCC-SLP  Speech-Language Pathologist  [facility] - Department of Otolaryngology  [facility]  Mame [email or web address]  [phone]    *This note may have been completed using [id number]-vs-rydu dictation software, so errors may exist. Please contact me for clarification if needed*

## 2022-11-14 NOTE — PROGRESS NOTES
[facility]  Clinical Voice and Upper Airway Evaluation Report    Patient's Name: Arash Huston  Date of Evaluation: 11/14/2022  Providing SLP: Gisselle Bang MS CCC-SLP  Seen in Conjunction With: Lorna Reid MD MPH  Referring Provider and Facility: Mary Ferreira NP [facility]  Insurance Coverage: [employer] Other PPO  Chief Complaint: Cough  Assessment / Treatment Time: 7:47 AM - 8:38 AM  Evaluation Location: [facility]  Others in Attendance for the Evaluation: None      Patient History: Arash is a 32-year-old male who presents today for evaluation of chronic cough.     Dysphonia:    Denies voice changes    Endorses some vocal fatigue with singing    Dyspnea:     No pulmonary conditions    Reduced pulmonary function due to scoliosis - no treatments  o T1-9 fusion 20 years ago    Dysphagia: denies possible aspiration, things getting stuck, unintentional weight loss, recent pneumonia    Cough / Throat Clearing:     Onset: a couple years ago    Both coughing and throat clearing    Triggers:    First thing in the morning secondary to throat congestion/increased mucus    When eating and drinking or immediately after    Feels more throat irritation rather than something going down the wrong pipe    Unsure of triggering items: maybe tomato products/acidic foods    Possibly eating too quickly    Possibly stress    Denies scents, temperature, exercise, or talking as triggers    Progression:     Gradually improving over the past couple of months     Improves as the day continues    Better since switching from omeprazole and Mucinex to famotidine and wedge pillow    Swallowing helps a little    Productivity: was fairly productive but is less so with overall coughing improvements    Frequency/severity: was bigger coughing attacks but hasn't had these in a couple of months - now more 1-3 coughs here and there    Medical / Surgical History:    GERD    Scoliosis    Other Medical  Evaluations, Testing, and Treatments:     Famotidine in PM    Pulmonology evaluation: no benefits from inhalers    Allergy evaluation: no significant allergies    GI    Denies straight-forward indigestion or heartburn - mostly feels phlegm    Normal EGD with [facility] on 7/14/22    Declined Bravo testing, as this would not likely change treatment plan    Switched to famotidine only - was previously taking omeprazole and Mucinex    Wedge pillow    ENT: visit with [facility] on 12/8/21    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        Quality of Life Questionnaires:    Patient Supplied Answers To Last 2 CSI Questionnaires  Cough Severity Index (CSI) 11/14/2022   My cough is worse when I lie down 1   My coughing problem causes me to restrict my personal and social life 1   I tend to avoid places because of my cough problem 1   I feel embarrassed because of my coughing problem 3   People ask, ''What's wrong?'' because I cough a lot 2   I run out of air when I cough 0   My coughing problem affects my voice 1   My coughing problem limits my physical activity 1   My coughing problem upsets me 2   People ask me if I am sick because I cough a lot 3   CSI Score 15     Patient Supplied Answers To Last 2 EAT Questionnaires  Eating Assessment Tool (EAT-10) 11/14/2022   My swallowing problem interferes with my ability to go out for meals 1   Swallowing liquids takes extra effort 0   Swallowing solids takes extra effort 0   Swallowing pills takes extra effort 0   When I swallow food sticks in my throat 2   I cough when I eat 2   Swallowing is stressful 1   EAT-10 6       Perceptual Analysis (23122): Evaluation of Voice / Speech / Non-Communicative Laryngeal Behaviors    The GRBAS is a perceptual rating of voice change. 0 indicated no impairment, 3 indicates a severe impairment. This is a rating based on clinical judgement of disordered voice quality.  G ( 1 ) General  "Dysphonia     R ( 0 ) Roughness     B ( 0 ) Breathiness     A ( 1 ) Asthenia     S ( 1 ) Strain    *Validity of this measure may be slightly reduced when performed via acoustic signal from virtual visit*    Laryngeal palpation:     Thyrohyoid space: compressed and tender    Additional observations:     Coughing / throat clearing: noted intermittently, particularly after topical anesthetic      Laryngoscopy without stroboscopy:    Provider performing exam: Lorna Reid MD MPH    Informed consent: Informed consent was obtained, which includes potential side-effects, risks, and benefits of the procedure.     Anesthetic: Topical anesthesia with 3% lidocaine and 0.25% phenylephrine was applied the nostrils bilaterally.    Scope type: A distal chip flexible laryngoscope was passed through the nare with halogen light source(s).    The laryngeal and pharyngeal structures were evaluated for gross appearance, mobility, function, and focal lesions / abnormalities of the associated mucosa.  All findings were within normal limits with the exception of the following salient features:     Mediolateral Compression: noted with phonation and improved during therapeutic probing    Anteroposterior Compression: \" \"       Impressions and Plan:     Arash is a 32-year-old male presenting today with chronic cough (R05.3) and dysphonia (R49.0) secondary to irritable larynx syndrome (J38.7). Perceptually, his voice is mildly asthenic and strained. Laryngoscopy today demonstrated mediolateral and anteroposterior compression with phonation which reduced during stimulability probes (e.g. increased airflow). Therefore, voice therapy to reduce chronic coughing/throat clearing and vocal fatigue has been recommended. Arash is in agreement with this plan of care.        Goals:    Chronic Cough    Decrease cough in all activities of daily living using compensation strategies    Independently implement a cough suppression strategy when cough " trigger is sensed 90% of the time.?     Decrease the number of coughs per day by 50%     Demonstrate increased tolerance of a chemical and/or environmental irritant as evidenced by increased exposure (decreased proximity and/or increased strength) to that irritant by 50%     Demonstrate a 50% reduction in Cough Severity Index score     Voice    Coordinate phonatory and respiratory subsystems, demonstrating adequate independence for activities of daily communication     Decrease extrinsic laryngeal muscle activity during communication events     Improve voice quality in all activities of daily living using, as measured by a minimum of a 50% point reduction on the Voice Handicap Index-10 or Singing VHI    Demonstrate appropriate vocal hygiene including, but not limited to, adequate hydration, avoiding vocal abuse, integrating behavioral and dietary changes for GERD into their daily life?.     Incorporate behaviors to reduce irritation and promote laryngeal healing and prevent recurrence.?     Implement behavioral strategies to reduce laryngopharyngeal reflux. ?     Independently maintain a forward focus voice placement 90% of the time during conversational speech.    Independently perform the Vocal Function Exercises, rebalancing respiration, phonation, and resonance 90% of the time      Billed Procedures:    No charge facility fee    Perceptual voice assessment [id number]      Thank you for allowing me to participate in this patient's care,    Gisselle Bang MS CCC-SLP  Speech-Language Pathologist  [facility] - Department of Otolaryngology  [facility]  [email or web address]  [phone]    *This note may have been completed using [id number] dictation software, so errors may exist. Please contact me for clarification if needed*

## 2022-12-21 ENCOUNTER — VIRTUAL VISIT (OUTPATIENT)
Dept: OTOLARYNGOLOGY | Facility: CLINIC | Age: 32
End: 2022-12-21
Payer: COMMERCIAL

## 2022-12-21 DIAGNOSIS — R49.0 DYSPHONIA: ICD-10-CM

## 2022-12-21 DIAGNOSIS — R05.3 CHRONIC COUGH: Primary | ICD-10-CM

## 2022-12-21 DIAGNOSIS — J38.7 IRRITABLE LARYNX SYNDROME: ICD-10-CM

## 2022-12-21 PROCEDURE — 92507 TX SP LANG VOICE COMM INDIV: CPT | Mod: GN | Performed by: SPEECH-LANGUAGE PATHOLOGIST

## 2022-12-21 NOTE — LETTER
12/21/2022       RE: Arash Huston  [address]  [place] [address]     Dear Colleague,    Thank you for referring your patient, Arash Huston, to the [facility] VOICE CLINIC [place] at [facility]. Please see a copy of my visit note below.    [facility] VOICE [facility]  VOICE / UPPER AIRWAY TREATMENT NOTE (CPT 51019)      Patient's name: Arash Huston  Date of Session: 12/21/2022  Providing SLP: Gisselle Bang MS CCC-SLP  Referring Provider: Lorna Reid MD MPH  Insurance Coverage: [employer] HMO  Total # of SLP Visits: 2  # of SLP Therapy Sessions: 1  Session Location: Arash was seen via telehealth today.     The patient has been notified and verbally consented to the following statements:     This video visit will be conducted between you and your provider.    Patient has opted to conduct today's video visit vs an in-person appointment, and is not able to attend due to possible exposure to COVID-19.      If during the course of the call the provider feels a video visit is not appropriate, you will not be charged for this service.     Provider has received verbal consent for billable virtual visit from the patient? Yes  Preferred method for receiving information: Firetide  Call initiated at: 10:00 AM  Call ended at: 10:38 AM  Platform used to conduct today's virtual appointment: [employer] Video  Location of provider: Residence   Location of patient: Residence       Impressions from most recent evaluation on 11/14/22:   Arash is a 32-year-old male presenting today with chronic cough (R05.3) and dysphonia (R49.0) secondary to irritable larynx syndrome (J38.7). Perceptually, his voice is mildly asthenic and strained. Laryngoscopy today demonstrated mediolateral and anteroposterior compression with phonation which reduced during stimulability probes (e.g. increased airflow). Therefore, voice therapy to reduce chronic coughing/throat clearing and vocal  fatigue has been recommended. Arash is in agreement with this plan of care.        SUBJECTIVE:  Parker reports some improvements with the amount he is coughing since his most recent session. He does notice more coughing issues immediately after eating, particularly when eating crunchy foods like nuts or crackers or if he is eating quickly      OBJECTIVE/ASSESSMENT:    Patient Supplied Answers To Last 2 CSI Questionnaires  Cough Severity Index (CSI) 11/14/2022 12/21/2022   My cough is worse when I lie down 1 2   My coughing problem causes me to restrict my personal and social life 1 2   I tend to avoid places because of my cough problem 1 1   I feel embarrassed because of my coughing problem 3 2   People ask, ''What's wrong?'' because I cough a lot 2 2   I run out of air when I cough 0 1   My coughing problem affects my voice 1 1   My coughing problem limits my physical activity 1 1   My coughing problem upsets me 2 3   People ask me if I am sick because I cough a lot 3 2   CSI Score 15 17     Patient Supplied Answers To Last 2 EAT Questionnaires  Eating Assessment Tool (EAT-10) 11/14/2022   My swallowing problem interferes with my ability to go out for meals 1   Swallowing liquids takes extra effort 0   Swallowing solids takes extra effort 0   Swallowing pills takes extra effort 0   When I swallow food sticks in my throat 2   I cough when I eat 2   Swallowing is stressful 1   EAT-10 6       THERAPEUTIC ACTIVITIES:  Vocal hygiene concepts were discussed with the patient to optimize vocal health. Systemic and topical hydration was emphasized.  Parker reports drinking about 16 ounces of water per day, 1 to 2 cups of coffee, 1-2 beers throughout the week, and some juice and water.  Gradually increasing his water intake has been recommended to help with overall hydration. Reducing alcohol and caffeine is also recommended to reduce the drying impacts.  Parker reports that he had previously used cough drops and found them to be  helpful, and it was recommended they switch to pectin-based lozenges. Reducing laryngeal irritation from GERD, allergies, etc is also important, and Parker reports sleeping with a wedge pillow, which he has found to be helpful.  He has also recognized some food items that have made his reflux worse, and he tries to lessen intake of this.  Swallow precautions were also instructed today, particularly taking small bites, chewing more, and using liquid washes to avoid throat irritation from scratchy items.    Cough and throat clearing reduction strategies are a behavioral treatment to replace chronic coughing/throat clearing behaviors with multiple effortful swallows, coughing and throat clearing without phonation, and rescue breathing, thus reducing laryngeal hypersensitivity. These exercises were instructed today, and Parker performed them with high accuracy following clinician cueing and modeling.  After the session, he had reported that he ate a sausage just prior to our session, and this no longer felt stuck in his throat like it had at the beginning of the appointment.      IMPRESSIONS:   Chronic cough (R05.3) and dysphonia (R49.0) secondary to irritable larynx syndrome (J38.7)    Parker reports some improvements in the amount of coughing since his most recent evaluation.  Vocal hygiene concepts were introduced, and it has been recommended that he continue using a humidifier, as well as gradually increase his systemic water intake.  He will continue to adhere to reflux precautions, in addition to swallow precautions to help with bolus transit.  Coughing and throat clearing reduction strategies were introduced, and Parker endorsed the benefits of this after the session today.       PLAN:    Parker will perform cough suppression strategies as needed between sessions.    Parker will adhere to vocal hygiene recommendations    Written instructions were provided to aid home programming via [employer]    Parker continues to demonstrate  adequate progress toward long- and short-term goals.    Continued voice therapy services are recommended. Parker will follow-up for additional sessions in 6 weeks. Current goals will continue to be addressed.    Parker is in agreement with this plan of care.      BILLING SUMMARY:    Total treatment time: 38 minutes    Speech Pathology Treatment (39120)    No charge facility fee ([id number])      Gisselle Bang MS CCC-SLP  Speech-Language Pathologist  [facility] - Department of Otolaryngology  [facility]  [email or web address]  [phone]    *This note may have been completed using [id number] dictation software, so errors may exist. Please contact me for clarification if needed*      Again, thank you for allowing me to participate in the care of your patient.      Sincerely,    Gisselle Bang, SLP

## 2022-12-21 NOTE — PROGRESS NOTES
[facility] VOICE CLINIC  VOICE / UPPER AIRWAY TREATMENT NOTE (CPT 63339)      Patient's name: Arash Huston  Date of Session: 12/21/2022  Providing SLP: Gisselle Bang MS CCC-SLP  Referring Provider: Lorna Reid MD MPH  Insurance Coverage: [employer]  Total # of SLP Visits: 2  # of SLP Therapy Sessions: 1  Session Location: Arash was seen via telehealth today.     The patient has been notified and verbally consented to the following statements:     This video visit will be conducted between you and your provider.    Patient has opted to conduct today's video visit vs an in-person appointment, and is not able to attend due to possible exposure to COVID-19.      If during the course of the call the provider feels a video visit is not appropriate, you will not be charged for this service.     Provider has received verbal consent for billable virtual visit from the patient? Yes  Preferred method for receiving information: [employer]  Call initiated at: 10:00 AM  Call ended at: 10:38 AM  Platform used to conduct today's virtual appointment: [employer]  Location of provider: Residence   Location of patient: Residence       Impressions from most recent evaluation on 11/14/22:   Arash is a 32-year-old male presenting today with chronic cough (R05.3) and dysphonia (R49.0) secondary to irritable larynx syndrome (J38.7). Perceptually, his voice is mildly asthenic and strained. Laryngoscopy today demonstrated mediolateral and anteroposterior compression with phonation which reduced during stimulability probes (e.g. increased airflow). Therefore, voice therapy to reduce chronic coughing/throat clearing and vocal fatigue has been recommended. Arash is in agreement with this plan of care.        SUBJECTIVE:  Parker reports some improvements with the amount he is coughing since his most recent session. He does notice more coughing issues immediately after eating, particularly when eating crunchy foods like nuts or crackers or  if he is eating quickly      OBJECTIVE/ASSESSMENT:    Patient Supplied Answers To Last 2 CSI Questionnaires  Cough Severity Index (CSI) 11/14/2022 12/21/2022   My cough is worse when I lie down 1 2   My coughing problem causes me to restrict my personal and social life 1 2   I tend to avoid places because of my cough problem 1 1   I feel embarrassed because of my coughing problem 3 2   People ask, ''What's wrong?'' because I cough a lot 2 2   I run out of air when I cough 0 1   My coughing problem affects my voice 1 1   My coughing problem limits my physical activity 1 1   My coughing problem upsets me 2 3   People ask me if I am sick because I cough a lot 3 2   CSI Score 15 17     Patient Supplied Answers To Last 2 EAT Questionnaires  Eating Assessment Tool (EAT-10) 11/14/2022   My swallowing problem interferes with my ability to go out for meals 1   Swallowing liquids takes extra effort 0   Swallowing solids takes extra effort 0   Swallowing pills takes extra effort 0   When I swallow food sticks in my throat 2   I cough when I eat 2   Swallowing is stressful 1   EAT-10 6       THERAPEUTIC ACTIVITIES:  Vocal hygiene concepts were discussed with the patient to optimize vocal health. Systemic and topical hydration was emphasized.  Parker reports drinking about 16 ounces of water per day, 1 to 2 cups of coffee, 1-2 beers throughout the week, and some juice and water.  Gradually increasing his water intake has been recommended to help with overall hydration. Reducing alcohol and caffeine is also recommended to reduce the drying impacts.  Parker reports that he had previously used cough drops and found them to be helpful, and it was recommended they switch to pectin-based lozenges. Reducing laryngeal irritation from GERD, allergies, etc is also important, and Parker reports sleeping with a wedge pillow, which he has found to be helpful.  He has also recognized some food items that have made his reflux worse, and he tries to  lessen intake of this.  Swallow precautions were also instructed today, particularly taking small bites, chewing more, and using liquid washes to avoid throat irritation from scratchy items.    Cough and throat clearing reduction strategies are a behavioral treatment to replace chronic coughing/throat clearing behaviors with multiple effortful swallows, coughing and throat clearing without phonation, and rescue breathing, thus reducing laryngeal hypersensitivity. These exercises were instructed today, and Parker performed them with high accuracy following clinician cueing and modeling.  After the session, he had reported that he ate a sausage just prior to our session, and this no longer felt stuck in his throat like it had at the beginning of the appointment.      IMPRESSIONS:   Chronic cough (R05.3) and dysphonia (R49.0) secondary to irritable larynx syndrome (J38.7)    Parker reports some improvements in the amount of coughing since his most recent evaluation.  Vocal hygiene concepts were introduced, and it has been recommended that he continue using a humidifier, as well as gradually increase his systemic water intake.  He will continue to adhere to reflux precautions, in addition to swallow precautions to help with bolus transit.  Coughing and throat clearing reduction strategies were introduced, and Parker endorsed the benefits of this after the session today.       PLAN:    Parker will perform cough suppression strategies as needed between sessions.    Parker will adhere to vocal hygiene recommendations    Written instructions were provided to aid home programming via [employer]    Parker continues to demonstrate adequate progress toward long- and short-term goals.    Continued voice therapy services are recommended. Parker will follow-up for additional sessions in 6 weeks. Current goals will continue to be addressed.    Parker is in agreement with this plan of care.      BILLING SUMMARY:    Total treatment time: 38  minutes    Speech Pathology Treatment ([id number])    No charge facility fee ([id number])      Gisselle Bang MS CCC-SLP  Speech-Language Pathologist  [facility] - Department of Otolaryngology  [facility]  [email or web address]  [phone]    *This note may have been completed using [id number] dictation software, so errors may exist. Please contact me for clarification if needed*

## 2022-12-21 NOTE — PATIENT INSTRUCTIONS
"Vocal hygiene  Hydration: drink til you \"pee pale\"  64 oz of plain water daily OR your body weight in lbs divided by 2  More if you're sweating, active, etc  Watch out for caffeine and alcohol - they're drying and you need to drink more water to counteract their effects  Topical hydration with humidifiers  Nebulized isotonic saline  Isotonic saline (0.9%) nasal spray  Use a humidifier at night in your bedroom and/or during the day in frequently used rooms  Throat lozenges  Pectin-based preferred: Moren[employer] Kelly Breezers  Sugar-free candies  Gum  Wet fruits: grapes, watermelon, etc.  These help promote more saliva production and make you swallow more  Avoid menthol!  Gargling with water or saline water (recipe below)  Little amounts of water  Can also tilt head from side to side  If you smoke (e.g. cigarettes, cigars, vaping, marijuana), strongly consider quitting. Any sort of smoke in the throat will cause irritation!  Reduce the effects of allergies and/or acid reflux  Avoid refluxogenic foods: spicy, fatty/oily/greasy, citrus, tomato products, chocolate, mint, caffeine, etc.  Avoid eating or drinking 2-3 hours before you go to bed so those items have had time to work through your GI tract before you lie down  Elevate the head of your bed  Wedge pillow, blocks under bed posts, etc.  We want to keep your head/throat higher than your stomach to allow gravity to keep your stomach acid from coming up to your throat and causing irritation      Saline gargle recipe  8 oz water  1/4 tsp of salt  1/4 tsp of baking soda        Cough suppression techniques    Consecutive hard swallows  Take a sip of water if your mouth is dry  Make sure your swallowing is effortful, like you're swallowing something dry or solid  Start at the first sign that you need to cough or throat clear (e.g. throat tickle, mucus, something stuck)  Keep swallowing until you feel like the urge to cough / throat clear has passed - often about 3 - 4 " "times    \"Episcopal throat clear\"  Very light throat clearing to dislodge mucus  Like you're whispering \"eh eh eh\"    The \"Big H\"  To dislodge mucus, pass a lot of air through open vocal folds, like you're doing a big \"HHHH\"  Use the same amount of airflow (e.g. speed, amount) that you would for a cough  Make sure there's minimal noise and no coughing sound/vocal folds coming together    Calming breathing  Inhale slowly through your nose or through rounded lips  Exhale slowly on a \"shhhhhh\" or pulsed \"sh sh sh\"    Sips of very hot, very cold, carbonated, flavored liquid: this moves our body's attention away from our cough urge in our throat to our mouth    IF YOU HAVE TRIED ALL OF THESE THINGS AND YOU'VE HELD OFF YOUR COUGHING FOR 1 MINUTE +, YOU ARE ALLOWED TO COUGH - BUT MAKE IT COUNT! *    "

## 2023-02-20 NOTE — PROGRESS NOTES
[facility] VOICE CLINIC  VOICE / UPPER AIRWAY TREATMENT NOTE (CPT [id number])      Patient's name: Arash Huston  Date of Session: 2/20/23  Providing SLP: Gisselle Bang MS CCC-SLP  Referring Provider: Lorna Reid MD MPH  Insurance Coverage: [employer]  Total # of SLP Visits: 3  # of SLP Therapy Sessions: 2  Session Location: Arash was seen via telehealth today.     The patient has been notified and verbally consented to the following statements:     This video visit will be conducted between you and your provider.    Patient has opted to conduct today's video visit vs an in-person appointment, and is not able to attend due to possible exposure to COVID-19.      If during the course of the call the provider feels a video visit is not appropriate, you will not be charged for this service.     Provider has received verbal consent for billable virtual visit from the patient? Yes  Preferred method for receiving information: [employer]  Call initiated at: 10:27 AM  Call ended at: 11:16 AM  Platform used to conduct today's virtual appointment: [employer] Well Video  Location of provider: Residence   Location of patient: Residence       Impressions from most recent evaluation on 11/14/22:   Arash is a 32-year-old male presenting today with chronic cough (R05.3) and dysphonia (R49.0) secondary to irritable larynx syndrome (J38.7). Perceptually, his voice is mildly asthenic and strained. Laryngoscopy today demonstrated mediolateral and anteroposterior compression with phonation which reduced during stimulability probes (e.g. increased airflow). Therefore, voice therapy to reduce chronic coughing/throat clearing and vocal fatigue has been recommended. Arash is in agreement with this plan of care.       SUBJECTIVE:  Parker did well with increased hydration and cough suppression strategies for a couple weeks after our most recent session; however, his progress has worn off, and he reports stable coughing, primarily in the mornings and  after eating, over the past several weeks. He was finding hard swallows and rescue breathing techniques to be helpful when he was using them. He notes some benefit in his reflux symptoms lately with famotidine before bed and head of bed elevation.       OBJECTIVE/ASSESSMENT:    Patient Supplied Answers To Last 2 CSI Questionnaires  Cough Severity Index (CSI) 11/14/2022 12/21/2022   My cough is worse when I lie down 1 2   My coughing problem causes me to restrict my personal and social life 1 2   I tend to avoid places because of my cough problem 1 1   I feel embarrassed because of my coughing problem 3 2   People ask, ''What's wrong?'' because I cough a lot 2 2   I run out of air when I cough 0 1   My coughing problem affects my voice 1 1   My coughing problem limits my physical activity 1 1   My coughing problem upsets me 2 3   People ask me if I am sick because I cough a lot 3 2   CSI Score 15 17     Patient Supplied Answers To Last 2 EAT Questionnaires  Eating Assessment Tool (EAT-10) 11/14/2022   My swallowing problem interferes with my ability to go out for meals 1   Swallowing liquids takes extra effort 0   Swallowing solids takes extra effort 0   Swallowing pills takes extra effort 0   When I swallow food sticks in my throat 2   I cough when I eat 2   Swallowing is stressful 1   EAT-10 6       THERAPEUTIC ACTIVITIES:  Vocal hygiene concepts were discussed with the patient to optimize vocal health. Systemic and topical hydration was emphasized.  Parker had increased his hydration for a couple of weeks after our most recent session; however, he has not maintained this increase. Additionally, it has been recommended he try salt water and baking soda gargles, particularly in the mornings and after eating, to help neutralize acid in his throat that could be leading to his laryngeal sensitivity.    Cough and throat clearing reduction strategies are a behavioral treatment to replace chronic coughing/throat clearing  "behaviors with multiple effortful swallows, coughing and throat clearing without phonation, and rescue breathing, thus reducing laryngeal hypersensitivity. These exercises were reviewed today, and Parker performed them with high accuracy following clinician cueing and modeling. He coughed intermittently throughout the session and frequently took sips of water. At this time, it is recommended he return to performing these techniques more diligently to help with reducing laryngeal hypersensitivity with the \"Big H\" method (e.g. coughing without phonotrauma) and rescue breathing.      IMPRESSIONS:   Chronic cough (R05.3) and dysphonia (R49.0) secondary to irritable larynx syndrome (J38.7)    Parker did well with increasing his hydration and using cough suppression strategies for a couple of weeks following his initial therapy visit; however, he has not maintained these techniques in his lifestyle today. It has been recommended he return to cough suppression techniques and increased hydration more diligently, then consider possible GI referral given the correlation with coughing in the mornings and after eating.       PLAN:    Parker will perform cough suppression strategies as needed between sessions.    Parker will adhere to vocal hygiene recommendations    Written instructions were provided to aid home programming via [employer]    Parker continues to demonstrate adequate progress toward long- and short-term goals.    Continued voice therapy services are recommended. Parker will follow-up for additional sessions in 5 weeks. Current goals will continue to be addressed.    Parker is in agreement with this plan of care.      BILLING SUMMARY:    Total treatment time: 49 minutes    Speech Pathology Treatment (65746)    No charge facility fee ([id number])      Gisselle Bang MS CCC-SLP  Speech-Language Pathologist  [facility] - Department of Otolaryngology  [facility]  [email or web address]  [phone]    *This note " may have been completed using [id number] dictation software, so errors may exist. Please contact me for clarification if needed*

## 2023-02-21 ENCOUNTER — VIRTUAL VISIT (OUTPATIENT)
Dept: OTOLARYNGOLOGY | Facility: CLINIC | Age: 33
End: 2023-02-21
Payer: COMMERCIAL

## 2023-02-21 DIAGNOSIS — R05.3 CHRONIC COUGH: Primary | ICD-10-CM

## 2023-02-21 DIAGNOSIS — R49.0 DYSPHONIA: ICD-10-CM

## 2023-02-21 DIAGNOSIS — J38.7 IRRITABLE LARYNX SYNDROME: ICD-10-CM

## 2023-02-21 PROCEDURE — 92507 TX SP LANG VOICE COMM INDIV: CPT | Mod: GN | Performed by: SPEECH-LANGUAGE PATHOLOGIST

## 2023-02-21 NOTE — PATIENT INSTRUCTIONS
"Vocal hygiene  Hydration: drink til you \"pee pale\"  64 oz of plain water daily OR your body weight in lbs divided by 2  More if you're sweating, active, etc  Watch out for caffeine and alcohol - they're drying and you need to drink more water to counteract their effects  Topical hydration with humidifiers  Nebulized isotonic saline  Isotonic saline (0.9%) nasal spray  Use a humidifier at night in your bedroom and/or during the day in frequently used rooms  Throat lozenges  Pectin-based preferred: Yash Kelly's Breezers  Sugar-free candies  Gum  Wet fruits: grapes, watermelon, etc.  These help promote more saliva production and make you swallow more  Avoid menthol!  Gargling with water or saline water (recipe below)  Little amounts of water  Can also tilt head from side to side  If you smoke (e.g. cigarettes, cigars, vaping, marijuana), strongly consider quitting. Any sort of smoke in the throat will cause irritation!  Reduce the effects of allergies and/or acid reflux  Avoid refluxogenic foods: spicy, fatty/oily/greasy, citrus, tomato products, chocolate, mint, caffeine, etc.  Avoid eating or drinking 2-3 hours before you go to bed so those items have had time to work through your GI tract before you lie down  Elevate the head of your bed  Wedge pillow, blocks under bed posts, etc.  We want to keep your head/throat higher than your stomach to allow gravity to keep your stomach acid from coming up to your throat and causing irritation  Reflux Gourmet      Saline gargle recipe  8 oz water  1/4 tsp of salt  1/4 tsp of baking soda    Cough suppression techniques    Consecutive hard swallows  Take a sip of water if your mouth is dry  Make sure your swallowing is effortful, like you're swallowing something dry or solid  Start at the first sign that you need to cough or throat clear (e.g. throat tickle, mucus, something stuck)  Keep swallowing until you feel like the urge to cough / throat clear has passed - often about " "3 - 4 times    \"Anabaptist throat clear\"  Very light throat clearing to dislodge mucus  Like you're whispering \"eh eh eh\"    The \"Big H\"  To dislodge mucus, pass a lot of air through open vocal folds, like you're doing a big \"HHHH\"  Use the same amount of airflow (e.g. speed, amount) that you would for a cough  Make sure there's minimal noise and no coughing sound/vocal folds coming together    Calming breathing  Inhale slowly through your nose or through rounded lips  Exhale slowly on a \"shhhhhh\" or pulsed \"sh sh sh\"    Sips of very hot, very cold, carbonated, flavored liquid: this moves our body's attention away from our cough urge in our throat to our mouth    IF YOU HAVE TRIED ALL OF THESE THINGS AND YOU'VE HELD OFF YOUR COUGHING FOR 1 MINUTE +, YOU ARE ALLOWED TO COUGH - BUT MAKE IT COUNT! *      "

## 2023-02-21 NOTE — LETTER
2/21/2023       RE: Arash Huston  [address]  [place] [address]     Dear Colleague,    Thank you for referring your patient, Arash Huston, to the [facility] VOICE CLINIC [place] at [facility]. Please see a copy of my visit note below.    [facility] VOICE [facility]  VOICE / UPPER AIRWAY TREATMENT NOTE (CPT 41877)      Patient's name: Arash Huston  Date of Session: 2/20/23  Providing SLP: Gisselle Bang MS CCC-SLP  Referring Provider: Lorna Reid MD MPH  Insurance Coverage: [employer]  Total # of SLP Visits: 3  # of SLP Therapy Sessions: 2  Session Location: Arash was seen via telehealth today.     The patient has been notified and verbally consented to the following statements:     This video visit will be conducted between you and your provider.    Patient has opted to conduct today's video visit vs an in-person appointment, and is not able to attend due to possible exposure to COVID-19.      If during the course of the call the provider feels a video visit is not appropriate, you will not be charged for this service.     Provider has received verbal consent for billable virtual visit from the patient? Yes  Preferred method for receiving information: TapFit  Call initiated at: 10:27 AM  Call ended at: 11:16 AM  Platform used to conduct today's virtual appointment: AM Well Video  Location of provider: Residence   Location of patient: Residence       Impressions from most recent evaluation on 11/14/22:   Arash is a 32-year-old male presenting today with chronic cough (R05.3) and dysphonia (R49.0) secondary to irritable larynx syndrome (J38.7). Perceptually, his voice is mildly asthenic and strained. Laryngoscopy today demonstrated mediolateral and anteroposterior compression with phonation which reduced during stimulability probes (e.g. increased airflow). Therefore, voice therapy to reduce chronic coughing/throat clearing and vocal fatigue  has been recommended. Arash is in agreement with this plan of care.       SUBJECTIVE:  Parker did well with increased hydration and cough suppression strategies for a couple weeks after our most recent session; however, his progress has worn off, and he reports stable coughing, primarily in the mornings and after eating, over the past several weeks. He was finding hard swallows and rescue breathing techniques to be helpful when he was using them. He notes some benefit in his reflux symptoms lately with famotidine before bed and head of bed elevation.       OBJECTIVE/ASSESSMENT:    Patient Supplied Answers To Last 2 CSI Questionnaires  Cough Severity Index (CSI) 11/14/2022 12/21/2022   My cough is worse when I lie down 1 2   My coughing problem causes me to restrict my personal and social life 1 2   I tend to avoid places because of my cough problem 1 1   I feel embarrassed because of my coughing problem 3 2   People ask, ''What's wrong?'' because I cough a lot 2 2   I run out of air when I cough 0 1   My coughing problem affects my voice 1 1   My coughing problem limits my physical activity 1 1   My coughing problem upsets me 2 3   People ask me if I am sick because I cough a lot 3 2   CSI Score 15 17     Patient Supplied Answers To Last 2 EAT Questionnaires  Eating Assessment Tool (EAT-10) 11/14/2022   My swallowing problem interferes with my ability to go out for meals 1   Swallowing liquids takes extra effort 0   Swallowing solids takes extra effort 0   Swallowing pills takes extra effort 0   When I swallow food sticks in my throat 2   I cough when I eat 2   Swallowing is stressful 1   EAT-10 6       THERAPEUTIC ACTIVITIES:  Vocal hygiene concepts were discussed with the patient to optimize vocal health. Systemic and topical hydration was emphasized.  Parker had increased his hydration for a couple of weeks after our most recent session; however, he has not maintained this increase. Additionally, it has been  "recommended he try salt water and baking soda gargles, particularly in the mornings and after eating, to help neutralize acid in his throat that could be leading to his laryngeal sensitivity.    Cough and throat clearing reduction strategies are a behavioral treatment to replace chronic coughing/throat clearing behaviors with multiple effortful swallows, coughing and throat clearing without phonation, and rescue breathing, thus reducing laryngeal hypersensitivity. These exercises were reviewed today, and Parker performed them with high accuracy following clinician cueing and modeling. He coughed intermittently throughout the session and frequently took sips of water. At this time, it is recommended he return to performing these techniques more diligently to help with reducing laryngeal hypersensitivity with the \"Big H\" method (e.g. coughing without phonotrauma) and rescue breathing.      IMPRESSIONS:   Chronic cough (R05.3) and dysphonia (R49.0) secondary to irritable larynx syndrome (J38.7)    Parker did well with increasing his hydration and using cough suppression strategies for a couple of weeks following his initial therapy visit; however, he has not maintained these techniques in his lifestyle today. It has been recommended he return to cough suppression techniques and increased hydration more diligently, then consider possible GI referral given the correlation with coughing in the mornings and after eating.       PLAN:    Parker will perform cough suppression strategies as needed between sessions.    Parker will adhere to vocal hygiene recommendations    Written instructions were provided to aid home programming via DocTree    Parker continues to demonstrate adequate progress toward long- and short-term goals.    Continued voice therapy services are recommended. Parker will follow-up for additional sessions in 5 weeks. Current goals will continue to be addressed.    Parker is in agreement with this plan of " care.      BILLING SUMMARY:    Total treatment time: 49 minutes    Speech Pathology Treatment (67614)    No charge facility fee (51669)      *This note may have been completed using [id number] dictation software, so errors may exist. Please contact me for clarification if needed*          Again, thank you for allowing me to participate in the care of your patient.      Sincerely,    Gisselle Bang, SLP

## 2023-03-28 ENCOUNTER — VIRTUAL VISIT (OUTPATIENT)
Dept: OTOLARYNGOLOGY | Facility: CLINIC | Age: 33
End: 2023-03-28
Payer: COMMERCIAL

## 2023-03-28 DIAGNOSIS — R49.0 DYSPHONIA: ICD-10-CM

## 2023-03-28 DIAGNOSIS — R05.3 CHRONIC COUGH: Primary | ICD-10-CM

## 2023-03-28 DIAGNOSIS — J38.7 IRRITABLE LARYNX SYNDROME: ICD-10-CM

## 2023-03-28 PROCEDURE — 92507 TX SP LANG VOICE COMM INDIV: CPT | Mod: GN | Performed by: SPEECH-LANGUAGE PATHOLOGIST

## 2023-03-28 NOTE — PROGRESS NOTES
[facility] VOICE CLINIC  VOICE / UPPER AIRWAY TREATMENT NOTE (CPT 21097)      Patient's name: Arash Huston  Date of Session: 3/28/23  Providing SLP: Gisselle Bang MS CCC-SLP  Referring Provider: Lorna Reid MD MPH  Insurance Coverage: [employer]  Total # of SLP Visits: 4  # of SLP Therapy Sessions: 3  Session Location: Arash was seen via telehealth today.     The patient has been notified and verbally consented to the following statements:     This video visit will be conducted between you and your provider.    Patient has opted to conduct today's video visit vs an in-person appointment, and is not able to attend due to possible exposure to COVID-19.      If during the course of the call the provider feels a video visit is not appropriate, you will not be charged for this service.     Provider has received verbal consent for billable virtual visit from the patient? Yes  Preferred method for receiving information: StormP[employer]  Call initiated at: 10:00 AM  Call ended at: 10:28 AM  Platform used to conduct today's virtual appointment: AM Well Video  Location of provider: Residence   Location of patient: Residence       Impressions from most recent evaluation on 11/14/22:   Arash is a 32-year-old male presenting today with chronic cough (R05.3) and dysphonia (R49.0) secondary to irritable larynx syndrome (J38.7). Perceptually, his voice is mildly asthenic and strained. Laryngoscopy today demonstrated mediolateral and anteroposterior compression with phonation which reduced during stimulability probes (e.g. increased airflow). Therefore, voice therapy to reduce chronic coughing/throat clearing and vocal fatigue has been recommended. Arash is in agreement with this plan of care.       SUBJECTIVE:  Parker endorse some improvements to his coughing and throat symptoms since our most recent session. He continues to note coughing in the mornings and 15-30 minutes after eating. He feels that oatmeal with crunchy nuts will  make his throat feel worse. He was off his famotidine for a couple of days recently with mix findings (e.g. more vs less coughing). He recently ordered Reflux Gourmet but has not been able to try it yet.      OBJECTIVE/ASSESSMENT:    Patient Supplied Answers To Last 2 CSI Questionnaires  Cough Severity Index (CSI) 11/14/2022 12/21/2022   My cough is worse when I lie down 1 2   My coughing problem causes me to restrict my personal and social life 1 2   I tend to avoid places because of my cough problem 1 1   I feel embarrassed because of my coughing problem 3 2   People ask, ''What's wrong?'' because I cough a lot 2 2   I run out of air when I cough 0 1   My coughing problem affects my voice 1 1   My coughing problem limits my physical activity 1 1   My coughing problem upsets me 2 3   People ask me if I am sick because I cough a lot 3 2   CSI Score 15 17     Patient Supplied Answers To Last 2 EAT Questionnaires  Eating Assessment Tool (EAT-10) 11/14/2022   My swallowing problem interferes with my ability to go out for meals 1   Swallowing liquids takes extra effort 0   Swallowing solids takes extra effort 0   Swallowing pills takes extra effort 0   When I swallow food sticks in my throat 2   I cough when I eat 2   Swallowing is stressful 1   EAT-10 6       THERAPEUTIC ACTIVITIES:  Vocal hygiene concepts were discussed with the patient to optimize vocal health. Systemic and topical hydration was emphasized.  Parker has gotten better with his water intake recently and feels that this has been helpful, particularly as a cough suppression strategy. He has not tried salt water and baking soda gargles yet.    Cough and throat clearing reduction strategies are a behavioral treatment to replace chronic coughing/throat clearing behaviors with multiple effortful swallows, coughing and throat clearing without phonation, and rescue breathing, thus reducing laryngeal hypersensitivity. These exercises were reviewed today, and Parker  "performed them with high accuracy. He intermittently took sips of water during today's session, both due to thirst and to suppress coughing independently. He also finds the \"Big H\" method to be helpful. Parker feels that a good portion of his coughing is habitual and automatic, which has made it difficult the utilize the cough suppression techniques at the moment of onset.       IMPRESSIONS:   Chronic cough (R05.3) and dysphonia (R49.0) secondary to irritable larynx syndrome (J38.7)    Parker has returned to more diligent hydration and incorporation of cough suppression techniques and notes some benefit. He recently purchased Reflux Gourmet and is looking forward to trying this. We plan to connect in 2 weeks via iGo message to check on his progress and decide if we should continue with voice therapy (e.g. SOVT exercises, laryngeal massage, etc.) or consider referral back to GI for improved reflux management.      PLAN:    Parker will perform cough suppression strategies as needed between sessions.    Parker will adhere to vocal hygiene recommendations    Written instructions were provided to aid home programming via iGo    Parker continues to demonstrate adequate progress toward long- and short-term goals.    Continued voice therapy services are recommended. Parker will follow-up for additional sessions PRN depending on how is coughing responds to an additional reflux intervention. Current goals will continue to be addressed.    Parker is in agreement with this plan of care.      BILLING SUMMARY:    Total treatment time: 28 minutes    Speech Pathology Treatment (02726)      Gisselle Bang MS CCC-SLP  Speech-Language Pathologist  [facility] - Department of Otolaryngology  [facility]  [email or web address]  [phone]    *This note may have been completed using [id number] dictation software, so errors may exist. Please contact me for clarification if needed*  "

## 2023-03-28 NOTE — LETTER
3/28/2023       RE: Arash Huston  [address]  [place] [address]     Dear Colleague,    Thank you for referring your patient, Arash Huston, to the [facility] VOICE CLINIC [place] at [facility]. Please see a copy of my visit note below.    [facility] VOICE [facility]  VOICE / UPPER AIRWAY TREATMENT NOTE (CPT [id number])      Patient's name: Arash Huston  Date of Session: 3/28/23  Providing SLP: Gisselle Bang MS CCC-SLP  Referring Provider: Lorna Reid MD MPH  Insurance Coverage: [employer] O  Total # of SLP Visits: 4  # of SLP Therapy Sessions: 3  Session Location: Arash was seen via telehealth today.     The patient has been notified and verbally consented to the following statements:     This video visit will be conducted between you and your provider.    Patient has opted to conduct today's video visit vs an in-person appointment, and is not able to attend due to possible exposure to COVID-19.      If during the course of the call the provider feels a video visit is not appropriate, you will not be charged for this service.     Provider has received verbal consent for billable virtual visit from the patient? Yes  Preferred method for receiving information: [employer]  Call initiated at: 10:00 AM  Call ended at: 10:28 AM  Platform used to conduct today's virtual appointment: AM Well Video  Location of provider: Residence   Location of patient: Residence       Impressions from most recent evaluation on 11/14/22:   Arash is a 32-year-old male presenting today with chronic cough (R05.3) and dysphonia (R49.0) secondary to irritable larynx syndrome (J38.7). Perceptually, his voice is mildly asthenic and strained. Laryngoscopy today demonstrated mediolateral and anteroposterior compression with phonation which reduced during stimulability probes (e.g. increased airflow). Therefore, voice therapy to reduce chronic coughing/throat clearing and vocal fatigue  has been recommended. Arash is in agreement with this plan of care.       SUBJECTIVE:  Parker endorse some improvements to his coughing and throat symptoms since our most recent session. He continues to note coughing in the mornings and 15-30 minutes after eating. He feels that oatmeal with crunchy nuts will make his throat feel worse. He was off his famotidine for a couple of days recently with mix findings (e.g. more vs less coughing). He recently ordered Reflux Gourmet but has not been able to try it yet.      OBJECTIVE/ASSESSMENT:    Patient Supplied Answers To Last 2 CSI Questionnaires  Cough Severity Index (CSI) 11/14/2022 12/21/2022   My cough is worse when I lie down 1 2   My coughing problem causes me to restrict my personal and social life 1 2   I tend to avoid places because of my cough problem 1 1   I feel embarrassed because of my coughing problem 3 2   People ask, ''What's wrong?'' because I cough a lot 2 2   I run out of air when I cough 0 1   My coughing problem affects my voice 1 1   My coughing problem limits my physical activity 1 1   My coughing problem upsets me 2 3   People ask me if I am sick because I cough a lot 3 2   CSI Score 15 17     Patient Supplied Answers To Last 2 EAT Questionnaires  Eating Assessment Tool (EAT-10) 11/14/2022   My swallowing problem interferes with my ability to go out for meals 1   Swallowing liquids takes extra effort 0   Swallowing solids takes extra effort 0   Swallowing pills takes extra effort 0   When I swallow food sticks in my throat 2   I cough when I eat 2   Swallowing is stressful 1   EAT-10 6       THERAPEUTIC ACTIVITIES:  Vocal hygiene concepts were discussed with the patient to optimize vocal health. Systemic and topical hydration was emphasized.  Parker has gotten better with his water intake recently and feels that this has been helpful, particularly as a cough suppression strategy. He has not tried salt water and baking soda gargles yet.    Cough and  "throat clearing reduction strategies are a behavioral treatment to replace chronic coughing/throat clearing behaviors with multiple effortful swallows, coughing and throat clearing without phonation, and rescue breathing, thus reducing laryngeal hypersensitivity. These exercises were reviewed today, and Parker performed them with high accuracy. He intermittently took sips of water during today's session, both due to thirst and to suppress coughing independently. He also finds the \"Big H\" method to be helpful. Parker feels that a good portion of his coughing is habitual and automatic, which has made it difficult the utilize the cough suppression techniques at the moment of onset.       IMPRESSIONS:   Chronic cough (R05.3) and dysphonia (R49.0) secondary to irritable larynx syndrome (J38.7)    Parker has returned to more diligent hydration and incorporation of cough suppression techniques and notes some benefit. He recently purchased Reflux Gourmet and is looking forward to trying this. We plan to connect in 2 weeks via Codesion message to check on his progress and decide if we should continue with voice therapy (e.g. SOVT exercises, laryngeal massage, etc.) or consider referral back to GI for improved reflux management.      PLAN:    Parker will perform cough suppression strategies as needed between sessions.    Parker will adhere to vocal hygiene recommendations    Written instructions were provided to aid home programming via Codesion    Parker continues to demonstrate adequate progress toward long- and short-term goals.    Continued voice therapy services are recommended. Parker will follow-up for additional sessions PRN depending on how is coughing responds to an additional reflux intervention. Current goals will continue to be addressed.    Parker is in agreement with this plan of care.      BILLING SUMMARY:    Total treatment time: 28 minutes    Speech Pathology Treatment ([id number])      Gisselle Bang MS CCC-SLP  Speech-Language " Pathologist  [facility] - Department of Otolaryngology  [facility]  [email or web address]  [phone]    *This note may have been completed using [id number] dictation software, so errors may exist. Please contact me for clarification if needed*      Again, thank you for allowing me to participate in the care of your patient.      Sincerely,    Gisselle Bang, SLP

## 2023-05-06 ENCOUNTER — HEALTH MAINTENANCE LETTER (OUTPATIENT)
Age: 33
End: 2023-05-06

## 2024-07-13 ENCOUNTER — HEALTH MAINTENANCE LETTER (OUTPATIENT)
Age: 34
End: 2024-07-13

## 2025-07-19 ENCOUNTER — HEALTH MAINTENANCE LETTER (OUTPATIENT)
Age: 35
End: 2025-07-19